# Patient Record
Sex: MALE | Race: BLACK OR AFRICAN AMERICAN | NOT HISPANIC OR LATINO | URBAN - METROPOLITAN AREA
[De-identification: names, ages, dates, MRNs, and addresses within clinical notes are randomized per-mention and may not be internally consistent; named-entity substitution may affect disease eponyms.]

---

## 2022-02-26 ENCOUNTER — OFFICE VISIT (OUTPATIENT)
Dept: URGENT CARE | Facility: CLINIC | Age: 37
End: 2022-02-26
Payer: COMMERCIAL

## 2022-02-26 VITALS
OXYGEN SATURATION: 98 % | DIASTOLIC BLOOD PRESSURE: 89 MMHG | RESPIRATION RATE: 18 BRPM | HEIGHT: 70 IN | SYSTOLIC BLOOD PRESSURE: 140 MMHG | BODY MASS INDEX: 30.06 KG/M2 | HEART RATE: 75 BPM | TEMPERATURE: 98 F | WEIGHT: 210 LBS

## 2022-02-26 DIAGNOSIS — M43.6 TORTICOLLIS: Primary | ICD-10-CM

## 2022-02-26 PROBLEM — I10 BENIGN ESSENTIAL HTN: Status: ACTIVE | Noted: 2020-06-26

## 2022-02-26 PROBLEM — E78.00 PURE HYPERCHOLESTEROLEMIA: Status: ACTIVE | Noted: 2019-07-26

## 2022-02-26 PROBLEM — E66.3 OVERWEIGHT (BMI 25.0-29.9): Status: ACTIVE | Noted: 2019-07-26

## 2022-02-26 PROCEDURE — 3008F BODY MASS INDEX DOCD: CPT | Mod: CPTII,S$GLB,, | Performed by: EMERGENCY MEDICINE

## 2022-02-26 PROCEDURE — 1159F PR MEDICATION LIST DOCUMENTED IN MEDICAL RECORD: ICD-10-PCS | Mod: CPTII,S$GLB,, | Performed by: EMERGENCY MEDICINE

## 2022-02-26 PROCEDURE — 3077F SYST BP >= 140 MM HG: CPT | Mod: CPTII,S$GLB,, | Performed by: EMERGENCY MEDICINE

## 2022-02-26 PROCEDURE — 4010F PR ACE/ARB THEARPY RXD/TAKEN: ICD-10-PCS | Mod: CPTII,S$GLB,, | Performed by: EMERGENCY MEDICINE

## 2022-02-26 PROCEDURE — 3079F DIAST BP 80-89 MM HG: CPT | Mod: CPTII,S$GLB,, | Performed by: EMERGENCY MEDICINE

## 2022-02-26 PROCEDURE — 1160F PR REVIEW ALL MEDS BY PRESCRIBER/CLIN PHARMACIST DOCUMENTED: ICD-10-PCS | Mod: CPTII,S$GLB,, | Performed by: EMERGENCY MEDICINE

## 2022-02-26 PROCEDURE — 3008F PR BODY MASS INDEX (BMI) DOCUMENTED: ICD-10-PCS | Mod: CPTII,S$GLB,, | Performed by: EMERGENCY MEDICINE

## 2022-02-26 PROCEDURE — 99202 PR OFFICE/OUTPT VISIT, NEW, LEVL II, 15-29 MIN: ICD-10-PCS | Mod: S$GLB,,, | Performed by: EMERGENCY MEDICINE

## 2022-02-26 PROCEDURE — 99202 OFFICE O/P NEW SF 15 MIN: CPT | Mod: S$GLB,,, | Performed by: EMERGENCY MEDICINE

## 2022-02-26 PROCEDURE — 3079F PR MOST RECENT DIASTOLIC BLOOD PRESSURE 80-89 MM HG: ICD-10-PCS | Mod: CPTII,S$GLB,, | Performed by: EMERGENCY MEDICINE

## 2022-02-26 PROCEDURE — 4010F ACE/ARB THERAPY RXD/TAKEN: CPT | Mod: CPTII,S$GLB,, | Performed by: EMERGENCY MEDICINE

## 2022-02-26 PROCEDURE — 1160F RVW MEDS BY RX/DR IN RCRD: CPT | Mod: CPTII,S$GLB,, | Performed by: EMERGENCY MEDICINE

## 2022-02-26 PROCEDURE — 1159F MED LIST DOCD IN RCRD: CPT | Mod: CPTII,S$GLB,, | Performed by: EMERGENCY MEDICINE

## 2022-02-26 PROCEDURE — 3077F PR MOST RECENT SYSTOLIC BLOOD PRESSURE >= 140 MM HG: ICD-10-PCS | Mod: CPTII,S$GLB,, | Performed by: EMERGENCY MEDICINE

## 2022-02-26 RX ORDER — IBUPROFEN 800 MG/1
800 TABLET ORAL 3 TIMES DAILY
Qty: 30 TABLET | Refills: 0 | Status: SHIPPED | OUTPATIENT
Start: 2022-02-26

## 2022-02-26 RX ORDER — LISINOPRIL 10 MG/1
10 TABLET ORAL DAILY
COMMUNITY
Start: 2022-02-15

## 2022-02-26 RX ORDER — ERGOCALCIFEROL 1.25 MG/1
50000 CAPSULE ORAL
COMMUNITY
Start: 2021-03-30 | End: 2022-03-30

## 2022-02-26 RX ORDER — CYCLOBENZAPRINE HCL 10 MG
10 TABLET ORAL 3 TIMES DAILY PRN
Qty: 30 TABLET | Refills: 0 | Status: SHIPPED | OUTPATIENT
Start: 2022-02-26 | End: 2022-03-08

## 2022-02-26 RX ORDER — SERTRALINE HYDROCHLORIDE 50 MG/1
TABLET, FILM COATED ORAL
COMMUNITY
Start: 2021-12-03

## 2022-02-26 NOTE — PROGRESS NOTES
"Subjective:       Patient ID: Todd Multani is a 36 y.o. male.    Vitals:  height is 5' 10" (1.778 m) and weight is 95.3 kg (210 lb). His tympanic temperature is 98.2 °F (36.8 °C). His blood pressure is 140/89 (abnormal) and his pulse is 75. His respiration is 18 and oxygen saturation is 98%.     Chief Complaint: Torticollis    36-year-old male presents for evaluation of right-sided neck pain.  States that he woke up normally this morning but when he went to wash his face at about 5:00 a.m. he had a sudden stabbing pain on the right side of his neck radiating into the shoulder.  Feels most comfortable keeping his head turned away from the affected side.  Movement of the head and shoulder reproduce pain.  No history of this in the past.  No weakness or numbness in the extremities.  No recent traumas reported    Torticollis  This is a new problem. The current episode started today. The problem occurs constantly. The problem has been gradually worsening. Associated symptoms include myalgias and neck pain. Pertinent negatives include no abdominal pain, anorexia, arthralgias, change in bowel habit, chest pain, chills, congestion, coughing, diaphoresis, fatigue, fever, headaches, joint swelling, nausea, numbness, rash, sore throat, swollen glands, urinary symptoms, vertigo, visual change, vomiting or weakness. The symptoms are aggravated by exertion. He has tried ice, rest, lying down and NSAIDs for the symptoms. The treatment provided no relief.       Constitution: Negative for chills, sweating, fatigue and fever.   HENT: Negative for congestion and sore throat.    Neck: Positive for neck pain and neck stiffness.   Cardiovascular: Negative for chest pain.   Respiratory: Negative for cough.    Gastrointestinal: Negative for abdominal pain, nausea and vomiting.   Musculoskeletal: Positive for muscle ache. Negative for joint pain and joint swelling.   Skin: Negative for rash.   Neurological: Negative for history of " vertigo, headaches and numbness.       Objective:      Physical Exam   Constitutional: He is oriented to person, place, and time. He does not appear ill. No distress.   HENT:   Head: Normocephalic and atraumatic.   Eyes:      extraocular movement intact   Cardiovascular: Normal rate, regular rhythm and normal pulses.   Pulmonary/Chest: Effort normal.   Abdominal: Normal appearance.   Musculoskeletal:         General: Tenderness present.      Comments: C-spine within normal limits.  No definite trigger point in the trapezius.  There is a trigger point in the paracervical musculature midway on the right.   Neurological: He is alert and oriented to person, place, and time.      Comments: 5/5 bilateral upper extremity   Skin: Skin is warm and dry.   Psychiatric: His behavior is normal.   Nursing note and vitals reviewed.        Assessment:       1. Torticollis        Reviewed treatment of neck strain/trapezius strain, torticollis.  Advised patient not use heat on the area.  We discussed appropriate stretching for the cervical area as well as medical treatment of this.  Patient verbalizes understanding of and agreement with this plan    Plan:         Torticollis  -     ibuprofen (ADVIL,MOTRIN) 800 MG tablet; Take 1 tablet (800 mg total) by mouth 3 (three) times daily.  Dispense: 30 tablet; Refill: 0  -     cyclobenzaprine (FLEXERIL) 10 MG tablet; Take 1 tablet (10 mg total) by mouth 3 (three) times daily as needed for Muscle spasms.  Dispense: 30 tablet; Refill: 0

## 2022-02-26 NOTE — PATIENT INSTRUCTIONS
Ibuprofen as prescribed.  Take this with food on the stomach.  Cyclobenzaprine as prescribed.  This medication may cause you to become drowsy.  Do not operate vehicles or machinery while using this medication.  You may consider taking this only 1 time a day at bedtime to avoid excessive sedation.    Avoid heat to the area as this can exacerbation pain.  Do stretching exercises as instructed several times a day to improve mobility.    Trapezius stretch:  Ear to the shoulder on both sides, chin to the shoulder on both sides, chin down and chin up.  Hold each stretch for 30 seconds or 3 deep breaths.  Repeat frequently throughout the day for muscle release.        Neck Stretches   About this topic   Stretching is a kind of exercise. When you stretch, you make a specific muscle or group of muscles longer. Stretching is good for you. It increases blood flow to a muscle. This can help get your muscles ready for other exercises. Stretching can also help you relax and may keep you from hurting your muscles.  If you have neck problems, doing these exercises could make your problem worse.  General   Before starting with a program, ask your doctor if you are healthy enough to do these exercises. Your doctor may have you work with a  or physical therapist to make a safe exercise program to meet your needs.  Stretching Exercises   Stretching exercises keep your muscles flexible. They also stop them from getting tight. Start by doing each of these stretches 2 to 3 times. In order for your body to make changes, you will need to hold these stretches for 20 to 30 seconds. Try to do the stretches 2 to 3 times each day. Do all exercises slowly.  If you have balance problems, do not try standing stretches. There are other safer ways to stretch different muscles while sitting or lying down.  Passive neck stretches ? Put your left hand on top of your head. Your other arm can be at your side or behind your back. Pull your head  "toward your left shoulder until you feel a gentle stretch on the right side of your neck. Repeat on the other side using your other hand. Also, try this stretch by pulling in a diagonal direction. With your left hand on top of your head, pull your head down towards the direction of your left knee. You should feel this stretch towards the back on the right side of your neck. Repeat on the other side.  Active neck stretches:  Neck front-to-back motion ? Look down to the floor and then up at the ceiling.  Neck side-to-side motion ? Tilt your head to the side and bring your ear to your shoulder. Now, tilt your head to the other side.  Neck turning ? Turn only your head and look over your left shoulder. Now turn only your head and look over your right shoulder.  Corner stretches:  T position ? Stand about one foot away from a corner. Bend your elbows and bring your upper arms to shoulder height. Rest your arms on the wall. Keep your back straight and gently lean forward until you feel a stretch in the front of your chest and shoulders.  V position ? Stand about one foot away from a corner. With your elbows straight, put only your hands on the wall and make a letter "V". Keep your back straight and gently lean forward until you feel a stretch in the front of your chest and shoulders.  Shoulder circles ? Sit with your back straight. Raise just your shoulders up towards your ears. Move them back, down, and then forward in a [place]. Repeat, moving the shoulders in a [place] going forward.  Chin tucks ? Stand straight or lie down on your back. Tuck your chin in and lengthen the back of your neck. Return to the starting position and repeat. It may help to stand up against a wall during this exercise. Try gently pushing your chin with two fingers while trying to flatten your neck against the wall. If you do this exercise lying down, try using a small rolled up washcloth under your neck. Push down into the washcloth when tucking " in your chin.             What will the results be?   Prevent injury  Improve flexibility  Improve motion  Improve body posture  Lower stress  Reduce pain  Helpful tips   Stay active and work out to keep your muscles strong and flexible.  Keep a healthy weight so there is not extra stress on your joints. Eat a healthy diet to keep your muscles healthy.  Be sure you do not hold your breath when exercising. This can raise your blood pressure. If you tend to hold your breath, try counting out loud when exercising. If any exercise bothers you, stop right away.  Always warm up before stretching. Heated muscles stretch much easier than cool muscles. Stretching cool muscles can lead to injury.  Try walking and swinging your arms at an easy pace for a few minutes to warm up your muscles. Do this again after exercising.  Never bounce when doing stretches.  Doing exercises before a meal may be a good way to get into a routine.  Exercise may be slightly uncomfortable, but you should not have sharp pains. If you do get sharp pains, stop what you are doing. If the sharp pains continue, call your doctor.  Last Reviewed Date   2021-08-16  Consumer Information Use and Disclaimer   This information is not specific medical advice and does not replace information you receive from your health care provider. This is only a brief summary of general information. It does NOT include all information about conditions, illnesses, injuries, tests, procedures, treatments, therapies, discharge instructions or life-style choices that may apply to you. You must talk with your health care provider for complete information about your health and treatment options. This information should not be used to decide whether or not to accept your health care providers advice, instructions or recommendations. Only your health care provider has the knowledge and training to provide advice that is right for you.  Copyright   Copyright © 2021 [employer] and  its affiliates and/or licensors. All rights reserved.